# Patient Record
Sex: MALE | Race: BLACK OR AFRICAN AMERICAN
[De-identification: names, ages, dates, MRNs, and addresses within clinical notes are randomized per-mention and may not be internally consistent; named-entity substitution may affect disease eponyms.]

---

## 2017-05-14 ENCOUNTER — HOSPITAL ENCOUNTER (EMERGENCY)
Dept: HOSPITAL 18 - NAV ERS | Age: 22
Discharge: HOME | End: 2017-05-14
Payer: SELF-PAY

## 2017-05-14 DIAGNOSIS — J02.9: Primary | ICD-10-CM

## 2017-05-14 PROCEDURE — 87430 STREP A AG IA: CPT

## 2017-05-14 PROCEDURE — 87081 CULTURE SCREEN ONLY: CPT

## 2017-05-14 PROCEDURE — 99283 EMERGENCY DEPT VISIT LOW MDM: CPT

## 2017-05-26 ENCOUNTER — HOSPITAL ENCOUNTER (EMERGENCY)
Dept: HOSPITAL 18 - NAV ERS | Age: 22
Discharge: HOME | End: 2017-05-26
Payer: SELF-PAY

## 2017-05-26 DIAGNOSIS — N36.8: Primary | ICD-10-CM

## 2017-05-26 LAB
BACTERIA UR QL AUTO: (no result) HPF
RBC UR QL AUTO: (no result) HPF (ref 0–3)
SP GR UR STRIP: 1.02 (ref 1–1.03)
UNIDENT CRYS #/AREA URNS HPF: (no result) HPF
URNS CMNT MICRO: (no result)
WBC UR QL AUTO: (no result) HPF (ref 0–3)

## 2017-05-26 PROCEDURE — 81003 URINALYSIS AUTO W/O SCOPE: CPT

## 2017-05-26 PROCEDURE — 99283 EMERGENCY DEPT VISIT LOW MDM: CPT

## 2017-05-26 PROCEDURE — 81015 MICROSCOPIC EXAM OF URINE: CPT

## 2017-05-26 PROCEDURE — 87591 N.GONORRHOEAE DNA AMP PROB: CPT

## 2017-05-26 PROCEDURE — 87491 CHLMYD TRACH DNA AMP PROBE: CPT

## 2018-02-25 ENCOUNTER — HOSPITAL ENCOUNTER (EMERGENCY)
Dept: HOSPITAL 18 - NAV ERS | Age: 23
Discharge: HOME | End: 2018-02-25
Payer: SELF-PAY

## 2018-02-25 DIAGNOSIS — S40.011A: Primary | ICD-10-CM
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
## 2018-02-25 PROCEDURE — 96372 THER/PROPH/DIAG INJ SC/IM: CPT

## 2018-02-25 NOTE — RAD
RIGHT SCAPULA 2 VIEWS:

 

Date:  02/25/18 

 

HISTORY:  

Injury to upper back with pain. 

 

FINDINGS:

Scapula appears intact. The shoulder and AC joint appear unremarkable. Visualized right ribs appear i
ntact. 

 

IMPRESSION: 

No acute findings.  

 

 

POS: [facility]

## 2018-08-07 ENCOUNTER — HOSPITAL ENCOUNTER (EMERGENCY)
Dept: HOSPITAL 18 - NAV ERS | Age: 23
Discharge: HOME | End: 2018-08-07
Payer: SELF-PAY

## 2018-08-07 DIAGNOSIS — S63.502A: Primary | ICD-10-CM

## 2018-08-07 DIAGNOSIS — S50.02XA: ICD-10-CM
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
## 2018-08-07 NOTE — RAD
LEFT WRIST THREE VIEWS:

 

History: Injury, left wrist pain. 

 

FINDINGS/IMPRESSION: 

No acute fracture or dislocation is identified. 

 

If symptoms do not improve, follow up exam should be obtained in 7-10 days. 

 

POS: [facility]

## 2018-08-07 NOTE — RAD
LEFT ELBOW FOUR VIEWS:

 

History: Fall, left elbow pain. 

 

FINDINGS/IMPRESSION: 

No acute fracture or dislocation is identified. 

 

If symptoms do not improve, follow up exam should be obtained in 7-10 days. 

 

POS: [facility]